# Patient Record
Sex: MALE | Race: WHITE
[De-identification: names, ages, dates, MRNs, and addresses within clinical notes are randomized per-mention and may not be internally consistent; named-entity substitution may affect disease eponyms.]

---

## 2019-04-14 ENCOUNTER — HOSPITAL ENCOUNTER (EMERGENCY)
Dept: HOSPITAL 62 - ER | Age: 39
Discharge: HOME | End: 2019-04-14
Payer: SELF-PAY

## 2019-04-14 VITALS — SYSTOLIC BLOOD PRESSURE: 114 MMHG | DIASTOLIC BLOOD PRESSURE: 67 MMHG
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
## 2019-04-14 DIAGNOSIS — T40.601A: Primary | ICD-10-CM

## 2019-04-14 DIAGNOSIS — F17.200: ICD-10-CM

## 2019-04-14 DIAGNOSIS — Z88.6: ICD-10-CM

## 2019-04-14 DIAGNOSIS — Z91.018: ICD-10-CM

## 2019-04-14 DIAGNOSIS — J45.909: ICD-10-CM

## 2019-04-14 LAB
ADD MANUAL DIFF: NO
ALBUMIN SERPL-MCNC: 3.5 G/DL (ref 3.5–5)
ALP SERPL-CCNC: 76 U/L (ref 38–126)
ALT SERPL-CCNC: 25 U/L (ref 21–72)
ANION GAP SERPL CALC-SCNC: 7 MMOL/L (ref 5–19)
APAP SERPL-MCNC: < 10 UG/ML (ref 10–30)
APPEARANCE UR: CLEAR
APTT PPP: YELLOW S
AST SERPL-CCNC: 17 U/L (ref 17–59)
BARBITURATES UR QL SCN: NEGATIVE
BASOPHILS # BLD AUTO: 0 10^3/UL (ref 0–0.2)
BASOPHILS NFR BLD AUTO: 0.5 % (ref 0–2)
BILIRUB DIRECT SERPL-MCNC: 0.3 MG/DL (ref 0–0.4)
BILIRUB SERPL-MCNC: 0.3 MG/DL (ref 0.2–1.3)
BILIRUB UR QL STRIP: NEGATIVE
BUN SERPL-MCNC: 16 MG/DL (ref 7–20)
CALCIUM: 9.1 MG/DL (ref 8.4–10.2)
CHLORIDE SERPL-SCNC: 104 MMOL/L (ref 98–107)
CO2 SERPL-SCNC: 31 MMOL/L (ref 22–30)
EOSINOPHIL # BLD AUTO: 0.3 10^3/UL (ref 0–0.6)
EOSINOPHIL NFR BLD AUTO: 3 % (ref 0–6)
ERYTHROCYTE [DISTWIDTH] IN BLOOD BY AUTOMATED COUNT: 13.4 % (ref 11.5–14)
ETHANOL SERPL-MCNC: < 10 MG/DL
GLUCOSE SERPL-MCNC: 98 MG/DL (ref 75–110)
GLUCOSE UR STRIP-MCNC: NEGATIVE MG/DL
HCT VFR BLD CALC: 41.3 % (ref 37.9–51)
HGB BLD-MCNC: 14.1 G/DL (ref 13.5–17)
KETONES UR STRIP-MCNC: NEGATIVE MG/DL
LYMPHOCYTES # BLD AUTO: 1.4 10^3/UL (ref 0.5–4.7)
LYMPHOCYTES NFR BLD AUTO: 14 % (ref 13–45)
MCH RBC QN AUTO: 31.5 PG (ref 27–33.4)
MCHC RBC AUTO-ENTMCNC: 34.2 G/DL (ref 32–36)
MCV RBC AUTO: 92 FL (ref 80–97)
METHADONE UR QL SCN: NEGATIVE
MONOCYTES # BLD AUTO: 0.7 10^3/UL (ref 0.1–1.4)
MONOCYTES NFR BLD AUTO: 7.1 % (ref 3–13)
NEUTROPHILS # BLD AUTO: 7.3 10^3/UL (ref 1.7–8.2)
NEUTS SEG NFR BLD AUTO: 75.4 % (ref 42–78)
NITRITE UR QL STRIP: NEGATIVE
PCP UR QL SCN: NEGATIVE
PH UR STRIP: 5 [PH] (ref 5–9)
PLATELET # BLD: 161 10^3/UL (ref 150–450)
POTASSIUM SERPL-SCNC: 4 MMOL/L (ref 3.6–5)
PROT SERPL-MCNC: 7 G/DL (ref 6.3–8.2)
PROT UR STRIP-MCNC: NEGATIVE MG/DL
RBC # BLD AUTO: 4.49 10^6/UL (ref 4.35–5.55)
SALICYLATES SERPL-MCNC: < 1 MG/DL (ref 2–20)
SODIUM SERPL-SCNC: 141.9 MMOL/L (ref 137–145)
SP GR UR STRIP: 1.02
TOTAL CELLS COUNTED % (AUTO): 100 %
URINE BENZODIAZEPINES SCREEN: NEGATIVE
URINE COCAINE SCREEN: NEGATIVE
URINE MARIJUANA (THC) SCREEN: NEGATIVE
UROBILINOGEN UR-MCNC: NEGATIVE MG/DL (ref ?–2)
WBC # BLD AUTO: 9.7 10^3/UL (ref 4–10.5)

## 2019-04-14 PROCEDURE — 80053 COMPREHEN METABOLIC PANEL: CPT

## 2019-04-14 PROCEDURE — 80307 DRUG TEST PRSMV CHEM ANLYZR: CPT

## 2019-04-14 PROCEDURE — 81001 URINALYSIS AUTO W/SCOPE: CPT

## 2019-04-14 PROCEDURE — 99284 EMERGENCY DEPT VISIT MOD MDM: CPT

## 2019-04-14 PROCEDURE — 96374 THER/PROPH/DIAG INJ IV PUSH: CPT

## 2019-04-14 PROCEDURE — 93010 ELECTROCARDIOGRAM REPORT: CPT

## 2019-04-14 PROCEDURE — 85025 COMPLETE CBC W/AUTO DIFF WBC: CPT

## 2019-04-14 PROCEDURE — 93005 ELECTROCARDIOGRAM TRACING: CPT

## 2019-04-14 PROCEDURE — 36415 COLL VENOUS BLD VENIPUNCTURE: CPT

## 2019-04-14 NOTE — ER DOCUMENT REPORT
ED General





- General


Chief Complaint: Possible Overdose


Stated Complaint: POSSIBLE OVERDOSE


Time Seen by Provider: 19 03:32


Notes: 





Patient is a 38-year-old male presents with complaint of opiate overdose.  

Patient was found unresponsive with a piece of foil with white powder near him. 

Patient was given 2 mg of Narcan by the paramedics which caused him to awaken.  

Patient still a bit somnolent.  Patient does not want to tell me what he 

injected.  He denies being depressed.  He denies wanting to harm himself.  He 

denies any form of intentional overdose.


TRAVEL OUTSIDE OF THE [place] IN LAST 30 DAYS: No





- Related Data


Allergies/Adverse Reactions: 


                                        





aspirin [Aspirin] Allergy (Verified 14 12:31)


   


Raspberry Flavor, Artificial * [Raspberry Flavor, Artificial] Allergy (Verified 

14 12:31)


   











Past Medical History





- Social History


Smoking Status: Current Every Day Smoker


Frequency of alcohol use: Occasional


Drug Abuse: Methamphetamine


Family History: Reviewed & Not Pertinent


Patient has suicidal ideation: No


Patient has homicidal ideation: No


Pulmonary Medical History: Reports: Hx Asthma, Hx Pneumonia


Renal/ Medical History: Denies: Hx Peritoneal Dialysis


Psychiatric Medical History: Reports: Hx Attention Deficit Hyperactivity 

Disorder, Hx Bipolar Disorder, Hx Depression - Anxiety, Explosive Anger d/o





- Immunizations


Hx Diphtheria, Pertussis, Tetanus Vaccination: Yes





Review of Systems





- Review of Systems


Notes: 





My Normal Review Basic





REVIEW OF SYSTEMS:


CONSTITUTIONAL :  Denies fever,  chills, or sweats.  Denies recent illness.


EENT:   Denies eye, ear, throat, or mouth pain or symptoms.  Denies nasal or 

sinus congestion.


CARDIOVASCULAR:  Denies chest pain.


RESPIRATORY:  Denies cough, cold, or chest congestion.  Denies shortness of 

breath, difficulty breathing, or wheezing.


GASTROINTESTINAL:  Denies abdominal pain.  Denies nausea, vomiting, or diarrhea.


MUSCULOSKELETAL:  Denies neck or back pain or joint pain or swelling.


SKIN:   Denies rash or skin lesions.


NEUROLOGICAL: Patient found unconscious


ALL OTHER SYSTEMS REVIEWED AND NEGATIVE.





Physical Exam





- Vital signs


Vitals: 


                                        











Temp Pulse Resp BP Pulse Ox


 


 97.6 F   90   20   131/84 H  96 


 


 19 03:22  19 03:22  19 03:22  19 03:22  19 03:22














- Notes


Notes: 





General Appearance: Well nourished, somnolent, no acute distress, no obvious 

discomfort.


Vitals: reviewed, See vital signs table.


Head: no swelling or tenderness to the head


Eyes: PERRL, EOMI, Conjuctiva clear


Mouth: No decreasd moisture


Lungs: No wheezing, No rales, No rhonci, No accessory muscle use, good air 

exchange bilaterally.


Heart: Normal rate, Regular rythm, No murmur, no rub


Abdomen: Normal BS, soft, No rigidity, No abdominal tenderness, No guarding, no 

rebound, no abdominal masses, no organomegaly


Extremities: strength 5/5 in all extremities, good pulses in all extremities, 

multiple track marks in left antecubital fossa


Skin: warm, dry, appropriate color, no rash


Neuro: Speech initially garbled.  Patient is on.  Patient is able move all 4 

extremities on his own.  No focal neurologic deficits.





Course





- Re-evaluation


Re-evalutation: 





19 05:46


On reevaluation patient is arousable but still somnolent.  He is improving.  We 

will continue monitoring until he is symptoms have completely resolved.


04/15/19 06:08








Patient symptoms eventually improved and he was discharged home.  Patient denies

being suicidal homicidal.  Patient encouraged to avoid opiates and drugs in 

general.  Patient given Narcan kit by EMS.





Dictation of this chart was performed using voice recognition software; 

therefore, there may be some unintended grammatical errors.





- Vital Signs


Vital signs: 


                                        











Temp Pulse Resp BP Pulse Ox


 


 98.2 F   78   13   114/67   97 


 


 19 10:00  19 03:22  19 10:01  19 10:00  19 10:01














- Laboratory


Result Diagrams: 


                                 19 04:19





                                 19 04:19


Laboratory results interpreted by me: 


                                        











  19





  04:19


 


Carbon Dioxide  31 H


 


Salicylates  < 1.0 L


 


Acetaminophen  < 10 L














- EKG Interpretation by Me


Additional EKG results interpreted by me: 





19 05:06


EKG is reviewed and interpreted by me.  EKG shows sinus rhythm with a rate of 77

bpm.  No ST segment elevation or depression.  No ischemic T wave inversions.  CA

interval, QRS duration, QT intervals are within normal range.  No old EKG 

available for comparison.





Discharge





- Discharge


Clinical Impression: 


Opiate overdose


Qualifiers:


 Encounter type: initial encounter Injury intent: accidental or unintentional 

Qualified Code(s): T40.601A - Poisoning by unspecified narcotics, accidental 

(unintentional), initial encounter





Condition: Good


Disposition: HOME, SELF-CARE


Additional Instructions: 


Please avoid any opiate use.  You almost  tonight due to opiate use.  I have

prescribed you Narcan.  I have prescribed you Narcan. People will still 

potentially overdose or take too much of an opiate medication.  Please keep the 

Narcan with you so as if you do overdose again you have a medication that can 

reverse the overdose so that you do not stop breathing.  If you have to use the 

Narcan you should return to the ER immediately.  The paramedics of also left you

a Narcan kit to take with you.  Please feel free to return to ER anytime for 

help with opiate misuse or if you feel like you are becoming depressed or 

suicidal.

## 2019-04-14 NOTE — ER DOCUMENT REPORT
Doctor's Note


Notes: 





04/14/19 10:56


Patient seen and evaluated.  I did talk to lab who stated his amphetamine levels

were too high to result.  Patient has had clinical improvement while being in 

the emergency room.  He is able to stand and ambulate without difficulty.  He is

speaking in full sentences and in no acute distress.  Patient be discharged home

in stable condition.  He was encouraged to avoid illicit drugs.

## 2019-12-22 ENCOUNTER — HOSPITAL ENCOUNTER (EMERGENCY)
Dept: HOSPITAL 62 - ER | Age: 39
Discharge: HOME | End: 2019-12-22
Payer: SELF-PAY

## 2019-12-22 VITALS — SYSTOLIC BLOOD PRESSURE: 129 MMHG | DIASTOLIC BLOOD PRESSURE: 90 MMHG

## 2019-12-22 DIAGNOSIS — J03.90: Primary | ICD-10-CM

## 2019-12-22 DIAGNOSIS — R00.2: ICD-10-CM

## 2019-12-22 DIAGNOSIS — R42: ICD-10-CM

## 2019-12-22 DIAGNOSIS — J45.909: ICD-10-CM

## 2019-12-22 DIAGNOSIS — Z91.018: ICD-10-CM

## 2019-12-22 DIAGNOSIS — R13.10: ICD-10-CM

## 2019-12-22 DIAGNOSIS — Z88.8: ICD-10-CM

## 2019-12-22 DIAGNOSIS — R07.9: ICD-10-CM

## 2019-12-22 DIAGNOSIS — F17.200: ICD-10-CM

## 2019-12-22 DIAGNOSIS — R55: ICD-10-CM

## 2019-12-22 LAB
ADD MANUAL DIFF: NO
ALBUMIN SERPL-MCNC: 3.6 G/DL (ref 3.5–5)
ALP SERPL-CCNC: 118 U/L (ref 38–126)
ANION GAP SERPL CALC-SCNC: 7 MMOL/L (ref 5–19)
AST SERPL-CCNC: 20 U/L (ref 17–59)
BASOPHILS # BLD AUTO: 0 10^3/UL (ref 0–0.2)
BASOPHILS NFR BLD AUTO: 0.4 % (ref 0–2)
BILIRUB DIRECT SERPL-MCNC: 0.2 MG/DL (ref 0–0.4)
BILIRUB SERPL-MCNC: 0.6 MG/DL (ref 0.2–1.3)
BUN SERPL-MCNC: 8 MG/DL (ref 7–20)
CALCIUM: 8.8 MG/DL (ref 8.4–10.2)
CHLORIDE SERPL-SCNC: 100 MMOL/L (ref 98–107)
CO2 SERPL-SCNC: 31 MMOL/L (ref 22–30)
EOSINOPHIL # BLD AUTO: 0.2 10^3/UL (ref 0–0.6)
EOSINOPHIL NFR BLD AUTO: 2.2 % (ref 0–6)
ERYTHROCYTE [DISTWIDTH] IN BLOOD BY AUTOMATED COUNT: 13.7 % (ref 11.5–14)
GLUCOSE SERPL-MCNC: 122 MG/DL (ref 75–110)
HCT VFR BLD CALC: 42.6 % (ref 37.9–51)
HGB BLD-MCNC: 14.8 G/DL (ref 13.5–17)
LYMPHOCYTES # BLD AUTO: 1.2 10^3/UL (ref 0.5–4.7)
LYMPHOCYTES NFR BLD AUTO: 14.6 % (ref 13–45)
MCH RBC QN AUTO: 30.5 PG (ref 27–33.4)
MCHC RBC AUTO-ENTMCNC: 34.6 G/DL (ref 32–36)
MCV RBC AUTO: 88 FL (ref 80–97)
MONOCYTES # BLD AUTO: 0.5 10^3/UL (ref 0.1–1.4)
MONOCYTES NFR BLD AUTO: 6.8 % (ref 3–13)
NEUTROPHILS # BLD AUTO: 6.1 10^3/UL (ref 1.7–8.2)
NEUTS SEG NFR BLD AUTO: 76 % (ref 42–78)
PLATELET # BLD: 180 10^3/UL (ref 150–450)
POTASSIUM SERPL-SCNC: 3.9 MMOL/L (ref 3.6–5)
PROT SERPL-MCNC: 8 G/DL (ref 6.3–8.2)
RBC # BLD AUTO: 4.84 10^6/UL (ref 4.35–5.55)
TOTAL CELLS COUNTED % (AUTO): 100 %
WBC # BLD AUTO: 8.1 10^3/UL (ref 4–10.5)

## 2019-12-22 PROCEDURE — 96375 TX/PRO/DX INJ NEW DRUG ADDON: CPT

## 2019-12-22 PROCEDURE — 87070 CULTURE OTHR SPECIMN AEROBIC: CPT

## 2019-12-22 PROCEDURE — 99284 EMERGENCY DEPT VISIT MOD MDM: CPT

## 2019-12-22 PROCEDURE — 87880 STREP A ASSAY W/OPTIC: CPT

## 2019-12-22 PROCEDURE — 96365 THER/PROPH/DIAG IV INF INIT: CPT

## 2019-12-22 PROCEDURE — 86308 HETEROPHILE ANTIBODY SCREEN: CPT

## 2019-12-22 PROCEDURE — 87040 BLOOD CULTURE FOR BACTERIA: CPT

## 2019-12-22 PROCEDURE — 36415 COLL VENOUS BLD VENIPUNCTURE: CPT

## 2019-12-22 PROCEDURE — 70491 CT SOFT TISSUE NECK W/DYE: CPT

## 2019-12-22 PROCEDURE — 85025 COMPLETE CBC W/AUTO DIFF WBC: CPT

## 2019-12-22 PROCEDURE — 80053 COMPREHEN METABOLIC PANEL: CPT

## 2019-12-22 PROCEDURE — 87077 CULTURE AEROBIC IDENTIFY: CPT

## 2019-12-22 NOTE — ER DOCUMENT REPORT
ED Medical Screen (RME)





- General


Chief Complaint: Neck Swelling


Stated Complaint: SWOLLEN THROAT


Time Seen by Provider: 12/22/19 12:53


Notes: 





39-year-old transgender male presents to the emergency department with sore 

throat x3 or 4 days that got acutely worse over the last 24 hours.  Patient 

states he can "barely swallow" and is having difficulty handling his secretions 

but is able to.  Patient states that he has a swollen severely tender right 

mandibular lymph node and significant anterior neck pain.  No fevers or chills, 

no nausea or vomiting.  Patient is a smoker.





Exam: Appears to be in mild distress, significantly swollen right tonsillar 

pillar extending into the soft palate with some mild deviation of the uvula to 

the left, airway is patent.  3+ right mandibular lymphadenopathy that is acutely

tender to light touch





I have greeted and performed a rapid initial assessment of this patient.  A 

comprehensive ED assessment and evaluation of the patient, analysis of test 

results and completion of medical decision making process will be conducted by 

an additional ED providers.


TRAVEL OUTSIDE OF THE [place] IN LAST 30 DAYS: No





- Related Data


Allergies/Adverse Reactions: 


                                        





aspirin [Aspirin] Allergy (Verified 12/22/19 12:35)


   


Raspberry Flavor, Artificial * [Raspberry Flavor, Artificial] Allergy (Verified 

12/22/19 12:35)


   











Past Medical History





- Social History


Chew tobacco use (# tins/day): No


Frequency of alcohol use: None


Drug Abuse: None


Pulmonary Medical History: Reports: Hx Asthma, Hx Pneumonia


Renal/ Medical History: Denies: Hx Peritoneal Dialysis


Psychiatric Medical History: Reports: Hx Attention Deficit Hyperactivity 

Disorder, Hx Bipolar Disorder, Hx Depression - Anxiety, Explosive Anger d/o





- Immunizations


Hx Diphtheria, Pertussis, Tetanus Vaccination: Yes





Physical Exam





- Vital signs


Vitals: 





                                        











Temp Pulse Resp BP Pulse Ox


 


 97.5 F   128 H  18   139/86 H  98 


 


 12/22/19 12:32  12/22/19 12:32  12/22/19 12:32  12/22/19 12:32  12/22/19 12:32














Course





- Vital Signs


Vital signs: 





                                        











Temp Pulse Resp BP Pulse Ox


 


 97.5 F   128 H  18   139/86 H  98 


 


 12/22/19 12:35  12/22/19 12:32  12/22/19 12:35  12/22/19 12:32  12/22/19 12:35

## 2019-12-22 NOTE — ER DOCUMENT REPORT
ED ENT





- General


Chief Complaint: Neck Swelling


Stated Complaint: SWOLLEN THROAT


Time Seen by Provider: 12/22/19 12:53


Primary Care Provider: 


ANDRAE LUBIN DO [ASSOCIATE] - Follow up tomorrow (FOR ENT FOLLOW UP)


TRAVEL OUTSIDE OF THE [place] IN LAST 30 DAYS: No





- HPI


Notes: 





39-year-old male to the emergency department with complaints of right-sided sore

 throat has been getting progressively worse in the past 3 to 4 days.  He states

 it started just initially as a regular sore throat and then he is gotten more 

pain into the right side for the past couple of days.  He states that he has 

right-sided neck swelling.  He denies any fevers or chills.  He has noticed a 

voice change.  He states that he has had some trouble swallowing.  He denies any

 fevers, chills.  He has not taken anything for his sore throat.





- Related Data


Allergies/Adverse Reactions: 


                                        





aspirin [Aspirin] Allergy (Verified 12/22/19 12:35)


   


Raspberry Flavor, Artificial * [Raspberry Flavor, Artificial] Allergy (Verified 

12/22/19 12:35)


   











Past Medical History





- General


Information source: Patient





- Social History


Smoking Status: Current Every Day Smoker


Chew tobacco use (# tins/day): No


Frequency of alcohol use: None


Drug Abuse: None


Lives with: Homeless


Family History: Reviewed & Not Pertinent


Patient has suicidal ideation: No


Patient has homicidal ideation: No


Pulmonary Medical History: Reports: Hx Asthma, Hx Pneumonia


Renal/ Medical History: Denies: Hx Peritoneal Dialysis


Psychiatric Medical History: Reports: Hx Attention Deficit Hyperactivity 

Disorder, Hx Bipolar Disorder, Hx Depression - Anxiety, Explosive Anger d/o





- Immunizations


Hx Diphtheria, Pertussis, Tetanus Vaccination: Yes





Review of Systems





- Review of Systems


Constitutional: denies: Chills, Fever


EENT: See HPI, Throat pain, Difficulty swallowing, Throat swelling


Cardiovascular: Chest pain, Palpitations, Heart racing, Syncope, Dizziness, 

Lightheaded


Respiratory: denies: Cough, Short of breath


Gastrointestinal: denies: Abdominal pain, Diarrhea, Nausea, Vomiting


Genitourinary: No symptoms reported


Male Genitourinary: No symptoms reported


Musculoskeletal: No symptoms reported


Skin: No symptoms reported


Hematologic/Lymphatic: No symptoms reported


Neurological/Psychological: No symptoms reported


-: Yes All other systems reviewed and negative





Physical Exam





- Vital signs


Vitals: 


                                        











Temp Pulse Resp BP Pulse Ox


 


 97.5 F   128 H  18   139/86 H  98 


 


 12/22/19 12:32  12/22/19 12:32  12/22/19 12:32  12/22/19 12:32  12/22/19 12:32











Interpretation: Normal





- General


General appearance: Appears well, Alert


In distress: None





- HEENT


Head: Normocephalic, Atraumatic


Eyes: Normal


Pupils: PERRL


Ears: Normal


External canal: Normal


Tympanic membrane: Normal


Sinus: Normal


Nasal: Normal


Mouth/Lips: Normal


Pharynx: Tonsillar hypertrophy - to the right tonsil there is edema and some 

slight push of the uvual away from midline.   The tonsils is slight flat in 

sheen.   Patient has slightly muffled voice.  He is not drooling.  He has no 

Austin's angina.   Airway is patent.


Neck: Normal, Supple.  No: Lymphadenopathy, Meningismus





- Respiratory


Respiratory status: No respiratory distress


Chest status: Nontender


Breath sounds: Normal.  No: Rales, Rhonchi, Stridor, Wheezing


Chest palpation: Normal





- Cardiovascular


Rhythm: Regular


Heart sounds: Normal auscultation


Murmur: No





- Abdominal


Inspection: Normal


Distension: No distension


Bowel sounds: Normal


Tenderness: Nontender


Organomegaly: No organomegaly





- Back


Back: Normal, Nontender.  No: CVA tenderness





- Extremities


General upper extremity: Normal inspection, Nontender, Normal color, Normal ROM,

Normal temperature


General lower extremity: Normal inspection, Nontender, Normal color, Normal ROM,

Normal temperature, Normal weight bearing





- Neurological


Neuro grossly intact: Yes


Cognition: Normal


Orientation: AAOx4


[place] Coma Scale Eye Opening: Spontaneous


[place] Coma Scale Verbal: Oriented


[place] Coma Scale Motor: Obeys Commands


[place] Coma Scale Total: 15


Speech: Normal


Cranial nerves: Normal.  No: Facial palsy, Forehead sparing, Gaze palsy, Sensory

deficit, Tongue deviation


Cerebellar coordination: Normal


Motor strength normal: LUE, RUE, LLE, RLE


Additional motor exam normals: Equal .  No: Pronator drift


Sensory: Normal





- Psychological


Associated symptoms: Normal affect, Normal mood





- Skin


Skin Temperature: Warm


Skin Moisture: Dry


Skin Color: Normal





Course





- Re-evaluation


Re-evalutation: 





12/22/19 18:39


Patient is doing well. Tolerating PO.   Will discharge home. ENT follow up.  

Return here if worsening. 


Discussed CT findings with Dr. Armstrong and plan for treatment and she agrees with

the plan. 





Impression;  Tonsillitis.  No PTA currently on CT.   Given clinda and decadron 

here.  Will discharge with clinda, pain meds.  have patient follow with ENT.  

Return here if worsening symptoms.  





- Vital Signs


Vital signs: 


                                        











Temp Pulse Resp BP Pulse Ox


 


 98 F   128 H  17   129/90 H  100 


 


 12/22/19 18:04  12/22/19 12:32  12/22/19 18:04  12/22/19 18:04  12/22/19 18:12











12/22/19 18:41


                                                                Selected Entries











  12/22/19 12/22/19 12/22/19





  18:03 18:04 18:12


 


Temperature  98 F 


 


Heart Rate ( 95  





Monitors)   


 


Respiratory  17 





Rate   


 


Blood Pressure  129/90 H 


 


O2 Sat by Pulse   100





Oximetry   





Noted improved vital signs.





- Laboratory


Result Diagrams: 


                                 12/22/19 13:45





                                 12/22/19 13:45


Laboratory results interpreted by me: 


                                        











  12/22/19





  13:45


 


Carbon Dioxide  31 H


 


Glucose  122 H














Discharge





- Discharge


Clinical Impression: 


 Tonsillitis, Painful swallowing





Condition: Stable


Disposition: HOME, SELF-CARE


Instructions:  Tonsillitis ([facility])


Additional Instructions: 


COMPLETE ALL ANTIBIOTICS.   PUSH FLUIDS -- SOFT DIET AND COOL LIQUIDS.  RETURN 

IF WORSENING SYMPTOMS.  FOLLOW UP WITH EAR/NOSE/THROAT SPECIALIST TOMORROW.   


Prescriptions: 


Clindamycin HCl 300 mg PO TID #30 capsule


Oxycodone HCl/Acetaminophen [Percocet 5-325 mg Tablet] 1 - 2 tab PO Q4H PRN #10 

tablet


 PRN Reason: 


Referrals: 


ANDRAE LUBIN DO [ASSOCIATE] - Follow up tomorrow (FOR ENT FOLLOW UP)

## 2019-12-22 NOTE — RADIOLOGY REPORT (SQ)
EXAM DESCRIPTION:  CT SOFT TISSUE NECK WITH



COMPLETED DATE/TIME:  12/22/2019 3:14 pm



REASON FOR STUDY:  concern R PTA



COMPARISON:  2012



TECHNIQUE:  Post IV contrasted scanning from skull base through lung apices with review of bone, soft
 tissue and lung windows.  Reconstructed coronal and sagittal MPR images reviewed.  All images stored
 on PACS.

All CT scanners at this facility use dose modulation, iterative reconstruction, and/or weight based d
osing when appropriate to reduce radiation dose to as low as reasonably achievable (ALARA).

[facility]: Dose Right  [facility]: CareDose    [facility]: Dose Right    [facility]: Teradose 4D    [facility]: Smart Technologies



CONTRAST TYPE AND DOSE:  contrast/concentration: Isovue 350.00 mg/ml; Total Contrast Delivered: 75.0 
ml; Total Saline Delivered: 55.0 ml



RENAL FUNCTION:  None required. The patient is less than 50 years old.



RADIATION DOSE:  CT Rad equipment meets quality standard of care and radiation dose reduction techniq
ues were employed. CTDIvol: 12.9 mGy. DLP: 350 mGy-cm. .



LIMITATIONS:  None.



FINDINGS:  SKULL BASE: Intact.

MAJOR SALIVARY GLANDS: No solid or cystic masses.  No inflammatory changes.

LYMPHADENOPATHY: No adenopathy.

MUCOSAL MASSES OR ASYMMETRY: Supraglottic asymmetry on the right without a focal abscess.

LARYNX/CORDS: Supraglottic asymmetry as described

VASCULAR STRUCTURES: The major vessels are patent.

LUNG APICES: Clear.

BONES: Intact.

THYROID: Normal size.  No masses.

PARANASAL SINUSES: Clear.

OTHER: No other significant finding.



IMPRESSION:  Suprahilar asymmetry on the right.  No identified abscess.



TECHNICAL DOCUMENTATION:  JOB ID:  [id number]

Quality ID # 436: Final reports with documentation of one or more dose reduction techniques (e.g., Au
tomated exposure control, adjustment of the mA and/or kV according to patient size, use of iterative 
reconstruction technique)

 2011 [email or web address]- All Rights Reserved



Reading location - IP/workstation name: [email or web address]

## 2019-12-24 ENCOUNTER — HOSPITAL ENCOUNTER (EMERGENCY)
Dept: HOSPITAL 62 - ER | Age: 39
LOS: 3 days | Discharge: ANOTHER HOSPITAL | End: 2019-12-27
Payer: SELF-PAY

## 2019-12-24 DIAGNOSIS — Z88.6: ICD-10-CM

## 2019-12-24 DIAGNOSIS — R59.0: ICD-10-CM

## 2019-12-24 DIAGNOSIS — R42: ICD-10-CM

## 2019-12-24 DIAGNOSIS — J03.90: ICD-10-CM

## 2019-12-24 DIAGNOSIS — R06.02: ICD-10-CM

## 2019-12-24 DIAGNOSIS — J39.1: Primary | ICD-10-CM

## 2019-12-24 DIAGNOSIS — F17.200: ICD-10-CM

## 2019-12-24 DIAGNOSIS — J39.0: ICD-10-CM

## 2019-12-24 LAB
ABSOLUTE LYMPHOCYTES# (MANUAL): 1.9 10^3/UL (ref 0.5–4.7)
ABSOLUTE MONOCYTES # (MANUAL): 0.6 10^3/UL (ref 0.1–1.4)
ADD MANUAL DIFF: YES
ALBUMIN SERPL-MCNC: 4 G/DL (ref 3.5–5)
ALP SERPL-CCNC: 122 U/L (ref 38–126)
ANION GAP SERPL CALC-SCNC: 11 MMOL/L (ref 5–19)
ANISOCYTOSIS BLD QL SMEAR: SLIGHT
AST SERPL-CCNC: 21 U/L (ref 17–59)
BASOPHILS NFR BLD MANUAL: 0 % (ref 0–2)
BILIRUB DIRECT SERPL-MCNC: 0.2 MG/DL (ref 0–0.4)
BILIRUB SERPL-MCNC: 0.6 MG/DL (ref 0.2–1.3)
BUN SERPL-MCNC: 16 MG/DL (ref 7–20)
CALCIUM: 9.4 MG/DL (ref 8.4–10.2)
CHLORIDE SERPL-SCNC: 100 MMOL/L (ref 98–107)
CO2 SERPL-SCNC: 29 MMOL/L (ref 22–30)
EOSINOPHIL NFR BLD MANUAL: 3 % (ref 0–6)
ERYTHROCYTE [DISTWIDTH] IN BLOOD BY AUTOMATED COUNT: 13.7 % (ref 11.5–14)
GLUCOSE SERPL-MCNC: 94 MG/DL (ref 75–110)
HCT VFR BLD CALC: 45.3 % (ref 37.9–51)
HGB BLD-MCNC: 15.4 G/DL (ref 13.5–17)
MCH RBC QN AUTO: 30.2 PG (ref 27–33.4)
MCHC RBC AUTO-ENTMCNC: 34 G/DL (ref 32–36)
MCV RBC AUTO: 89 FL (ref 80–97)
MONOCYTES % (MANUAL): 6 % (ref 3–13)
NEUTS BAND NFR BLD MANUAL: 1 % (ref 3–5)
PLATELET # BLD: 252 10^3/UL (ref 150–450)
PLATELET COMMENT: ADEQUATE
POTASSIUM SERPL-SCNC: 4 MMOL/L (ref 3.6–5)
PROT SERPL-MCNC: 8.7 G/DL (ref 6.3–8.2)
RBC # BLD AUTO: 5.1 10^6/UL (ref 4.35–5.55)
SEGMENTED NEUTROPHILS % (MAN): 71 % (ref 42–78)
TOTAL CELLS COUNTED BLD: 100
VARIANT LYMPHS NFR BLD MANUAL: 19 % (ref 13–45)
WBC # BLD AUTO: 10.2 10^3/UL (ref 4–10.5)

## 2019-12-24 PROCEDURE — 70491 CT SOFT TISSUE NECK W/DYE: CPT

## 2019-12-24 PROCEDURE — 99285 EMERGENCY DEPT VISIT HI MDM: CPT

## 2019-12-24 PROCEDURE — 80053 COMPREHEN METABOLIC PANEL: CPT

## 2019-12-24 PROCEDURE — 36415 COLL VENOUS BLD VENIPUNCTURE: CPT

## 2019-12-24 PROCEDURE — 96365 THER/PROPH/DIAG IV INF INIT: CPT

## 2019-12-24 PROCEDURE — 96361 HYDRATE IV INFUSION ADD-ON: CPT

## 2019-12-24 PROCEDURE — 93005 ELECTROCARDIOGRAM TRACING: CPT

## 2019-12-24 PROCEDURE — 96366 THER/PROPH/DIAG IV INF ADDON: CPT

## 2019-12-24 PROCEDURE — 96375 TX/PRO/DX INJ NEW DRUG ADDON: CPT

## 2019-12-24 PROCEDURE — 87040 BLOOD CULTURE FOR BACTERIA: CPT

## 2019-12-24 PROCEDURE — 85025 COMPLETE CBC W/AUTO DIFF WBC: CPT

## 2019-12-24 PROCEDURE — 93010 ELECTROCARDIOGRAM REPORT: CPT
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
## 2019-12-24 NOTE — EKG REPORT
SEVERITY:- ABNORMAL ECG -

SINUS TACHYCARDIA

PROBABLE LEFT ATRIAL ABNORMALITY

BORDERLINE T ABNORMALITIES, INFERIOR LEADS

:

Confirmed by: Brittanie Pollack MD 24-Dec-2019 20:15:24

## 2019-12-24 NOTE — RADIOLOGY REPORT (SQ)
EXAM DESCRIPTION: 



CT NECK WITH IV CONTRAST



COMPLETED DATE/TME:  12/24/2019 19:59



CLINICAL HISTORY: 



39 years, Male, difficulty swallowing, SOB -- eval for PTA



COMPARISON:

12/22/2019 CT



TECHNIQUE:

326  Images stored on PACS.

 

All CT scanners at this facility use dose modulation, iterative

reconstruction, and/or weight based dosing when appropriate to

reduce radiation dose to as low as reasonably achievable (ALARA).





[facility]: Dose Right [facility]: CareDose   [facility]: Dose Right    [facility]:

Teradose 4D    [facility]: Smart Kaminario



LIMITATIONS:

None.



FINDINGS:



Limited evaluation of brain parenchyma is unremarkable. The

globes are intact. Mucosal thickening of the paranasal sinuses.

The epiglottis is normal. Prominent adenoid tissue of the

posterior nasopharynx. There is enlargement/hypertrophy of the

tonsillar pillars bilaterally, worse on the right. Inflammatory

changes in the parapharyngeal region causes narrowing of the

upper airway. In addition there is increased mixed soft tissue

and fluid density in the retropharyngeal region of the upper

airway suspicious for retropharyngeal abscess. Thickening of the

prevertebral soft tissues. At the C2 level, approximate thickness

of the prevertebral soft tissues is 1.26 cm, with normal 6 mm.

The epiglottis is normal. There is extensive cervical chain

adenopathy bilaterally, particularly in the right jugulodigastric

chain with a large and partially low density lymph node,

measuring 3.43 x 2.48 cm. The parotid and submandibular glands

are unremarkable. Asymmetry in the right supraglottic region

persists, as described previously. Thyroid gland enhances

normally. Limited evaluation of the lung apices is unremarkable..





IMPRESSION:



Significant tonsillar hypertrophy with surrounding inflammatory

change and phlegmon in the parapharyngeal space, worse on the

right. Developing abscess not excluded. In addition there is

abnormal thickening in the prevertebral/retropharyngeal space

worrisome for developing retropharyngeal abscess . There is

associated, reactive cervical chain adenopathy.

 

TECHNICAL DOCUMENTATION:



Quality ID # [id number]: Final reports with documentation of one or more

dose reduction techniques (e.g., Automated exposure control,

adjustment of the mA and/or kV according to patient size, use of

iterative reconstruction technique)



copyright 2011 [employer]- All Rights Reserved
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
## 2019-12-25 RX ADMIN — SODIUM CHLORIDE PRN MLS/HR: 9 INJECTION, SOLUTION INTRAVENOUS at 01:01

## 2019-12-25 RX ADMIN — CLINDAMYCIN PHOSPHATE SCH MLS/HR: 18 INJECTION, SOLUTION INTRAVENOUS at 09:07

## 2019-12-25 RX ADMIN — CLINDAMYCIN PHOSPHATE SCH MLS/HR: 18 INJECTION, SOLUTION INTRAVENOUS at 14:59

## 2019-12-25 RX ADMIN — SODIUM CHLORIDE PRN MLS/HR: 9 INJECTION, SOLUTION INTRAVENOUS at 00:00

## 2019-12-25 RX ADMIN — CLINDAMYCIN PHOSPHATE SCH MLS/HR: 18 INJECTION, SOLUTION INTRAVENOUS at 23:20

## 2019-12-25 NOTE — ER DOCUMENT REPORT
Doctor's Note


Notes: 





12/25/19 05:10


I assumed care of this patient from Dr. Paulino pending reevaluation in the 

morning.  His initial plan was for the patient to have case management see him. 

However, upon review of the patient's chart, I am concerned about discharging 

this patient.  CT scan shows developing retropharyngeal and peritonsillar 

abscess.  Patient will need IV antibiotics.  We do not have ENT coverage until 

the 29th.





04:45


I discussed patient's care with Dr. Ferrara, ENT at [facility]  She recommends 

hospitalist admission for IV antibiotics initially.  If patient does not improve

within 24 to 48 hours, or patient gets worse, surgical management will be 

indicated at that time. She will be glad to help manage pt.


04:53


I discussed the care with our hospitalist, Dr. Weiland.  He does not feel 

comfortable admitting this patient here since there is no ENT backup in the 

event that the patient does poorly.  He recommends transfer.


05:10


I discussed patient's care with Dr. Trivedi, hospitalist at [facility].  She accepts 

transfer.


05:13


Patient reevaluated.  No airway compromise at this time.  He is hemodynamically 

stable.


12/25/19 05:14





Diagnosis: 1.  Retropharyngeal abscess.


2.  Parapharengeal abscess.


Disposition: Transfer to [facility]


12/25/19 05:18

## 2019-12-26 RX ADMIN — CLINDAMYCIN PHOSPHATE SCH MLS/HR: 18 INJECTION, SOLUTION INTRAVENOUS at 23:47

## 2019-12-26 RX ADMIN — CLINDAMYCIN PHOSPHATE SCH MLS/HR: 18 INJECTION, SOLUTION INTRAVENOUS at 13:42

## 2019-12-26 RX ADMIN — CLINDAMYCIN PHOSPHATE SCH MLS/HR: 18 INJECTION, SOLUTION INTRAVENOUS at 05:57

## 2019-12-27 VITALS — SYSTOLIC BLOOD PRESSURE: 110 MMHG | DIASTOLIC BLOOD PRESSURE: 53 MMHG

## 2019-12-27 NOTE — ER DOCUMENT REPORT
Doctor's Note


Notes: 





12/27/19 03:15


I have seen this patient last night, shift and now serial exams at the beginning

and end of my shift.  He is still nontoxic his vitals have remained stable he 

has been receiving clindamycin IV every 8 hours his last dose was at 10 PM 

tonight.  I discussed his case with both ENT advised it again and then also at 

[facility] was actually able to be at the computer to review his 

images with me and they say yes there is some inflammatory change in the area 

but if any space-occupying lesion is was extremely small at this time on the 

24th when imaging was done therefore management still would be repeat the CT 

scan if he were to have worsening symptoms or signs but otherwise continue 

antibiotics ensure ensure he is resolving.  Patient still has fullness in the 

right submandibular anterior neck region but continues to have again been 

nontoxic and have no voice change drooling difficulty swallowing.  I have 

ordered him a diet over 24 hours ago which he is been tolerating well.  He has 

not had any steroids in over 24 hours.  I have ordered his next dose of 

clindamycin which is scheduled for 6 AM 12/272019 as an oral dose 900 mg.  And 

then thereafter for every 8.  We have kept him on the list for the vidant 

transfer to hospitalist service.  I do feel that he still needs observation to e

nsure that he continues to at least not worsen while we transition him to oral 

and he definitely still is requiring antibiotics since he is not had actual 

resolution or did crease in his sensation of the neck swelling.  Therefore I 

would say he was stable but still needing continued inpatient observation with 

the possibility that he would need more immediate ENT evaluation if for some 

reason failed transition to oral.  Otherwise likely will just need to follow-up 

with ENT once is felt his infectious retropharyngeal process is resolving.





His social situation seems complex he was kicked out of a friend's house he says

before presenting to the hospital.  He is never been in a shelter homeless but 

he was also kicked out of another friend's house prior to moving to this last 

friend.  He has no family here he is jobless does not have medical insurance 

does not have transportation, so will need  assistance to find 

housing

## 2020-01-25 ENCOUNTER — HOSPITAL ENCOUNTER (EMERGENCY)
Dept: HOSPITAL 62 - ER | Age: 40
Discharge: HOME | End: 2020-01-25
Payer: SELF-PAY

## 2020-01-25 VITALS — DIASTOLIC BLOOD PRESSURE: 87 MMHG | SYSTOLIC BLOOD PRESSURE: 108 MMHG

## 2020-01-25 DIAGNOSIS — T40.1X1A: Primary | ICD-10-CM
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
## 2020-01-25 DIAGNOSIS — F17.200: ICD-10-CM

## 2020-01-25 DIAGNOSIS — Z88.6: ICD-10-CM

## 2020-01-25 LAB
ADD MANUAL DIFF: NO
ALBUMIN SERPL-MCNC: 3.6 G/DL (ref 3.5–5)
ALP SERPL-CCNC: 98 U/L (ref 38–126)
ANION GAP SERPL CALC-SCNC: 9 MMOL/L (ref 5–19)
APPEARANCE UR: CLEAR
APTT PPP: YELLOW S
AST SERPL-CCNC: 167 U/L (ref 17–59)
BARBITURATES UR QL SCN: NEGATIVE
BASOPHILS # BLD AUTO: 0.1 10^3/UL (ref 0–0.2)
BASOPHILS NFR BLD AUTO: 0.6 % (ref 0–2)
BILIRUB DIRECT SERPL-MCNC: 0 MG/DL (ref 0–0.4)
BILIRUB SERPL-MCNC: 0.6 MG/DL (ref 0.2–1.3)
BILIRUB UR QL STRIP: NEGATIVE
BUN SERPL-MCNC: 8 MG/DL (ref 7–20)
CALCIUM: 9.1 MG/DL (ref 8.4–10.2)
CHLORIDE SERPL-SCNC: 100 MMOL/L (ref 98–107)
CO2 SERPL-SCNC: 29 MMOL/L (ref 22–30)
EOSINOPHIL # BLD AUTO: 0.2 10^3/UL (ref 0–0.6)
EOSINOPHIL NFR BLD AUTO: 1.9 % (ref 0–6)
ERYTHROCYTE [DISTWIDTH] IN BLOOD BY AUTOMATED COUNT: 14.4 % (ref 11.5–14)
GLUCOSE SERPL-MCNC: 126 MG/DL (ref 75–110)
GLUCOSE UR STRIP-MCNC: NEGATIVE MG/DL
HCT VFR BLD CALC: 44 % (ref 37.9–51)
HGB BLD-MCNC: 15.1 G/DL (ref 13.5–17)
INR PPP: 1.02
KETONES UR STRIP-MCNC: NEGATIVE MG/DL
LYMPHOCYTES # BLD AUTO: 1.5 10^3/UL (ref 0.5–4.7)
LYMPHOCYTES NFR BLD AUTO: 15.1 % (ref 13–45)
MCH RBC QN AUTO: 30.1 PG (ref 27–33.4)
MCHC RBC AUTO-ENTMCNC: 34.4 G/DL (ref 32–36)
MCV RBC AUTO: 88 FL (ref 80–97)
METHADONE UR QL SCN: NEGATIVE
MONOCYTES # BLD AUTO: 0.4 10^3/UL (ref 0.1–1.4)
MONOCYTES NFR BLD AUTO: 4.3 % (ref 3–13)
NEUTROPHILS # BLD AUTO: 7.7 10^3/UL (ref 1.7–8.2)
NEUTS SEG NFR BLD AUTO: 78.1 % (ref 42–78)
NITRITE UR QL STRIP: NEGATIVE
PCP UR QL SCN: NEGATIVE
PH UR STRIP: 6 [PH] (ref 5–9)
PLATELET # BLD: 148 10^3/UL (ref 150–450)
POTASSIUM SERPL-SCNC: 3.4 MMOL/L (ref 3.6–5)
PROT SERPL-MCNC: 7.1 G/DL (ref 6.3–8.2)
PROT UR STRIP-MCNC: 30 MG/DL
PROTHROMBIN TIME: 13.4 SEC (ref 11.4–15.4)
RBC # BLD AUTO: 5.03 10^6/UL (ref 4.35–5.55)
SP GR UR STRIP: 1.01
TOTAL CELLS COUNTED % (AUTO): 100 %
URINE BENZODIAZEPINES SCREEN: NEGATIVE
URINE COCAINE SCREEN: (no result)
URINE MARIJUANA (THC) SCREEN: NEGATIVE
UROBILINOGEN UR-MCNC: NEGATIVE MG/DL (ref ?–2)
WBC # BLD AUTO: 9.9 10^3/UL (ref 4–10.5)

## 2020-01-25 PROCEDURE — 93005 ELECTROCARDIOGRAM TRACING: CPT

## 2020-01-25 PROCEDURE — 81001 URINALYSIS AUTO W/SCOPE: CPT

## 2020-01-25 PROCEDURE — 85025 COMPLETE CBC W/AUTO DIFF WBC: CPT

## 2020-01-25 PROCEDURE — 36415 COLL VENOUS BLD VENIPUNCTURE: CPT

## 2020-01-25 PROCEDURE — 80307 DRUG TEST PRSMV CHEM ANLYZR: CPT

## 2020-01-25 PROCEDURE — 85610 PROTHROMBIN TIME: CPT

## 2020-01-25 PROCEDURE — 80053 COMPREHEN METABOLIC PANEL: CPT

## 2020-01-25 PROCEDURE — 93010 ELECTROCARDIOGRAM REPORT: CPT

## 2020-01-25 PROCEDURE — 99284 EMERGENCY DEPT VISIT MOD MDM: CPT

## 2020-01-25 PROCEDURE — G0480 DRUG TEST DEF 1-7 CLASSES: HCPCS
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
## 2020-01-25 NOTE — EKG REPORT
SEVERITY:- BORDERLINE ECG -

SINUS RHYTHM

NONSPECIFIC  IVCD

:

Confirmed by: Johan Trevino MD 25-Jan-2020 10:22:32

## 2020-10-02 ENCOUNTER — HOSPITAL ENCOUNTER (EMERGENCY)
Dept: HOSPITAL 62 - ER | Age: 40
LOS: 1 days | Discharge: HOME | End: 2020-10-03
Payer: SELF-PAY

## 2020-10-02 DIAGNOSIS — Z88.6: ICD-10-CM

## 2020-10-02 DIAGNOSIS — F17.200: ICD-10-CM

## 2020-10-02 DIAGNOSIS — T40.1X1A: Primary | ICD-10-CM
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
## 2020-10-02 LAB
ADD MANUAL DIFF: NO
ALBUMIN SERPL-MCNC: 3.8 G/DL (ref 3.5–5)
ALP SERPL-CCNC: 99 U/L (ref 38–126)
ANION GAP SERPL CALC-SCNC: 7 MMOL/L (ref 5–19)
AST SERPL-CCNC: 37 U/L (ref 17–59)
BASOPHILS # BLD AUTO: 0 10^3/UL (ref 0–0.2)
BASOPHILS NFR BLD AUTO: 0.5 % (ref 0–2)
BILIRUB DIRECT SERPL-MCNC: 0.3 MG/DL (ref 0–0.4)
BILIRUB SERPL-MCNC: 0.5 MG/DL (ref 0.2–1.3)
BUN SERPL-MCNC: 14 MG/DL (ref 7–20)
CALCIUM: 9.1 MG/DL (ref 8.4–10.2)
CHLORIDE SERPL-SCNC: 100 MMOL/L (ref 98–107)
CO2 SERPL-SCNC: 31 MMOL/L (ref 22–30)
EOSINOPHIL # BLD AUTO: 0.2 10^3/UL (ref 0–0.6)
EOSINOPHIL NFR BLD AUTO: 2.2 % (ref 0–6)
ERYTHROCYTE [DISTWIDTH] IN BLOOD BY AUTOMATED COUNT: 13.6 % (ref 11.5–14)
ETHANOL SERPL-MCNC: < 10 MG/DL
GLUCOSE SERPL-MCNC: 112 MG/DL (ref 75–110)
HCT VFR BLD CALC: 43 % (ref 37.9–51)
HGB BLD-MCNC: 14.9 G/DL (ref 13.5–17)
LYMPHOCYTES # BLD AUTO: 1.4 10^3/UL (ref 0.5–4.7)
LYMPHOCYTES NFR BLD AUTO: 18.4 % (ref 13–45)
MCH RBC QN AUTO: 30.7 PG (ref 27–33.4)
MCHC RBC AUTO-ENTMCNC: 34.7 G/DL (ref 32–36)
MCV RBC AUTO: 88 FL (ref 80–97)
MONOCYTES # BLD AUTO: 0.5 10^3/UL (ref 0.1–1.4)
MONOCYTES NFR BLD AUTO: 7.3 % (ref 3–13)
NEUTROPHILS # BLD AUTO: 5.4 10^3/UL (ref 1.7–8.2)
NEUTS SEG NFR BLD AUTO: 71.6 % (ref 42–78)
PLATELET # BLD: 134 10^3/UL (ref 150–450)
POTASSIUM SERPL-SCNC: 3.9 MMOL/L (ref 3.6–5)
PROT SERPL-MCNC: 7 G/DL (ref 6.3–8.2)
RBC # BLD AUTO: 4.86 10^6/UL (ref 4.35–5.55)
TOTAL CELLS COUNTED % (AUTO): 100 %
WBC # BLD AUTO: 7.5 10^3/UL (ref 4–10.5)

## 2020-10-02 PROCEDURE — 99284 EMERGENCY DEPT VISIT MOD MDM: CPT

## 2020-10-02 PROCEDURE — 81001 URINALYSIS AUTO W/SCOPE: CPT

## 2020-10-02 PROCEDURE — 80053 COMPREHEN METABOLIC PANEL: CPT

## 2020-10-02 PROCEDURE — 93005 ELECTROCARDIOGRAM TRACING: CPT

## 2020-10-02 PROCEDURE — 96360 HYDRATION IV INFUSION INIT: CPT

## 2020-10-02 PROCEDURE — 85025 COMPLETE CBC W/AUTO DIFF WBC: CPT

## 2020-10-02 PROCEDURE — 96361 HYDRATE IV INFUSION ADD-ON: CPT

## 2020-10-02 PROCEDURE — 93010 ELECTROCARDIOGRAM REPORT: CPT

## 2020-10-02 PROCEDURE — 80307 DRUG TEST PRSMV CHEM ANLYZR: CPT

## 2020-10-02 PROCEDURE — 36415 COLL VENOUS BLD VENIPUNCTURE: CPT
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
## 2020-10-02 NOTE — EKG REPORT
SEVERITY:- OTHERWISE NORMAL ECG -

SINUS TACHYCARDIA

:

Confirmed by: Johan Trevino MD 02-Oct-2020 22:44:47

## 2020-10-03 VITALS — SYSTOLIC BLOOD PRESSURE: 145 MMHG | DIASTOLIC BLOOD PRESSURE: 108 MMHG

## 2020-10-03 LAB
APPEARANCE UR: CLEAR
APTT PPP: YELLOW S
BARBITURATES UR QL SCN: NEGATIVE
BILIRUB UR QL STRIP: NEGATIVE
GLUCOSE UR STRIP-MCNC: NEGATIVE MG/DL
KETONES UR STRIP-MCNC: NEGATIVE MG/DL
METHADONE UR QL SCN: NEGATIVE
NITRITE UR QL STRIP: NEGATIVE
PCP UR QL SCN: NEGATIVE
PH UR STRIP: 8 [PH] (ref 5–9)
PROT UR STRIP-MCNC: 30 MG/DL
SP GR UR STRIP: 1.01
URINE BENZODIAZEPINES SCREEN: NEGATIVE
URINE COCAINE SCREEN: NEGATIVE
URINE MARIJUANA (THC) SCREEN: NEGATIVE
UROBILINOGEN UR-MCNC: 2 MG/DL (ref ?–2)

## 2020-12-16 ENCOUNTER — HOSPITAL ENCOUNTER (EMERGENCY)
Dept: HOSPITAL 62 - ER | Age: 40
Discharge: HOME | End: 2020-12-16
Payer: SELF-PAY

## 2020-12-16 VITALS — DIASTOLIC BLOOD PRESSURE: 90 MMHG | SYSTOLIC BLOOD PRESSURE: 152 MMHG

## 2020-12-16 DIAGNOSIS — Z91.018: ICD-10-CM

## 2020-12-16 DIAGNOSIS — Z88.8: ICD-10-CM

## 2020-12-16 DIAGNOSIS — J45.909: ICD-10-CM

## 2020-12-16 DIAGNOSIS — L03.114: Primary | ICD-10-CM

## 2020-12-16 DIAGNOSIS — F15.10: ICD-10-CM

## 2020-12-16 PROCEDURE — 99283 EMERGENCY DEPT VISIT LOW MDM: CPT
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
